# Patient Record
Sex: FEMALE | Race: WHITE | NOT HISPANIC OR LATINO | Employment: OTHER | URBAN - METROPOLITAN AREA
[De-identification: names, ages, dates, MRNs, and addresses within clinical notes are randomized per-mention and may not be internally consistent; named-entity substitution may affect disease eponyms.]

---

## 2017-05-01 ENCOUNTER — TELEPHONE (OUTPATIENT)
Dept: VASCULAR SURGERY | Facility: CLINIC | Age: 72
End: 2017-05-01

## 2017-05-01 DIAGNOSIS — R60.0 LOCALIZED EDEMA: Primary | ICD-10-CM

## 2017-05-01 NOTE — TELEPHONE ENCOUNTER
----- Message from Daisy Singh sent at 5/1/2017  4:37 PM CDT -----  Contact: self [phone]  Please call her to schedule an appt for painful varicose veins.  Thank you!

## 2017-05-01 NOTE — TELEPHONE ENCOUNTER
----- Message from Rosemarie Chaves sent at 5/1/2017  4:33 PM CDT -----  Contact: Patient  Patient wants to get an appointment  for varicose veins, Please call her at [phone].    Thanks  Td

## 2017-05-03 ENCOUNTER — HOSPITAL ENCOUNTER (OUTPATIENT)
Dept: RADIOLOGY | Facility: HOSPITAL | Age: 72
Discharge: HOME | End: 2017-05-03
Attending: THORACIC SURGERY (CARDIOTHORACIC VASCULAR SURGERY)
Payer: MEDICARE

## 2017-05-03 DIAGNOSIS — R60.0 LOCALIZED EDEMA: ICD-10-CM

## 2017-05-03 PROCEDURE — 93970 EXTREMITY STUDY: CPT | Mod: 26,,, | Performed by: RADIOLOGY

## 2017-05-03 PROCEDURE — 93970 EXTREMITY STUDY: CPT | Mod: TC,PO

## 2017-05-25 ENCOUNTER — OFFICE VISIT (OUTPATIENT)
Dept: VASCULAR SURGERY | Facility: CLINIC | Age: 72
End: 2017-05-25
Payer: MEDICARE

## 2017-05-25 VITALS
HEART RATE: 81 BPM | RESPIRATION RATE: 18 BRPM | HEIGHT: 65 IN | BODY MASS INDEX: 27.82 KG/M2 | SYSTOLIC BLOOD PRESSURE: 129 MMHG | DIASTOLIC BLOOD PRESSURE: 72 MMHG | WEIGHT: 167 LBS

## 2017-05-25 DIAGNOSIS — I87.8 VENOUS STASIS: ICD-10-CM

## 2017-05-25 DIAGNOSIS — I87.2 VENOUS INSUFFICIENCY OF RIGHT LOWER EXTREMITY: Primary | ICD-10-CM

## 2017-05-25 PROCEDURE — 99213 OFFICE O/P EST LOW 20 MIN: CPT | Mod: PBBFAC,PO | Performed by: THORACIC SURGERY (CARDIOTHORACIC VASCULAR SURGERY)

## 2017-05-25 PROCEDURE — 99214 OFFICE O/P EST MOD 30 MIN: CPT | Mod: S$PBB,,, | Performed by: THORACIC SURGERY (CARDIOTHORACIC VASCULAR SURGERY)

## 2017-05-25 PROCEDURE — 99999 PR PBB SHADOW E&M-EST. PATIENT-LVL III: CPT | Mod: PBBFAC,,, | Performed by: THORACIC SURGERY (CARDIOTHORACIC VASCULAR SURGERY)

## 2017-05-25 NOTE — PROGRESS NOTES
OFFICE VISIT NOTE    Ms. Roberts is a 71-year-old female, who has venous stasis of the right lower   extremity.  I performed coronary artery bypass grafting on her in 2015.  She   had left main coronary artery stenosis and atrial fibrillation.  I also did   excision of left atrial appendage and MACE procedure.  Over the last five years,   she has been developing hyperpigmentation with induration of skin and   subcutaneous tissue of the distal one-third of the right leg.  She also has   significant varicose veins.  She does not have these changes on the left.  She   has been wearing compression stockings without much help.  However, she has not   been wearing these stockings every day.    PAST MEDICAL HISTORY:  Protein S deficiency of hypercoagulable state, atrial   fibrillation, coronary artery disease, hyperlipidemia.    PAST SURGICAL HISTORY:  Cardiac catheterization, coronary artery bypass graft,   hysterectomy with salpingo-oophorectomy, appendectomy, cholecystectomy,   hemorrhoidectomy, intracapsular cataract extraction.    ALLERGIES:  Statins.    MEDICATIONS:  Tylenol, Xanax, Paxil, Betapace, Coumadin, digoxin, Norco.    Whenever the patient gets off Coumadin, she uses Lovenox.    FAMILY HISTORY:  Coronary artery disease and hypertension.    SOCIAL HISTORY:  She never smoked cigarettes.  Denies alcohol use.    REVISION OF SYSTEMS:  She complains of varicose veins of the right lower   extremity with hyperpigmentation, especially on the medial aspect of the distal   one-third of the left leg.  She also complains of severe pain in the right lower   extremity.  This is especially over her varicose veins.  All other systems were   reviewed and are negative.    PHYSICAL EXAMINATION:  VITAL SIGNS:  Blood pressure is 129/72, respiratory rate is 16, heart rate is   81, height 5 feet 5 inches, weight 167 pounds.  GENERAL:  She is awake and alert, in no apparent distress.  HEENT:  Head is normocephalic.  Pupils are  equal, round, reactive.  Sclerae are   anicteric.  NECK:  Supple.  Trachea midline.  LUNGS:  Clear.  HEART:  Has a regular rate and rhythm.  The sternum is stable.  She has a   well-healed midline sternotomy scar.  ABDOMEN:  Soft and nontender.  EXTREMITIES:  Left lower extremity has no changes of venous stasis.  The right   lower extremity has hyperpigmentation of the distal one-third of the leg,   especially on the medial aspect.  She has varicose veins in the thigh and the   legs and has 1+ edema of the right lower extremity.  Feet are warm.  NEUROLOGIC:  Awake, alert and oriented.  No lateralizing neurologic deficits.  PULSE:  2+ brachial, 2+ radial, 2+ femoral pulses bilaterally.  She has 1+ right   posterior tibial pulse and 1+ left dorsalis pedis pulse.    Ultrasound of the veins of the lower extremities showed nonvisualization of the   left greater saphenous vein.  There was no reflux seen in the left short   saphenous vein.  The right greater saphenous vein was of a duplicated system   with one of these two seen down only to the mid thigh.  The other saphenous vein   going from the groin all the way down to the leg and ankle measured 9.6 mm at   the saphenofemoral junction, 5.5 mm at the mid thigh and 6.1 mm at the knee.    Reflux times in this vein was as high as 4115 milliseconds.  The right short   saphenous vein is 5.6 mm and reflux time is 1188 milliseconds.    IMPRESSION:  1.  Venous insufficiency of the right greater saphenous vein.  2.  Venous insufficiency of the right short saphenous vein.  3.  Venous stasis of the right lower extremity.  4.  Varicose veins with pain and edema of the right lower extremity.  5.  Status post coronary artery bypass grafting.  6.  Hypercoagulable state.  7.  Protein S deficiency.  8.  Hyperlipidemia.  9.  History of atrial fibrillation.    RECOMMENDATIONS:  The patient is very symptomatic in the right lower extremity   with venous stasis changes and varicose veins  causing pain and edema.  The   changes in the right lower extremity are consistent with lipodermatosclerosis.    I think the patient would benefit from EVLT of the right greater and right   lesser saphenous veins followed by sclerotherapy.  However, she does have a   protein S deficiency.  If we do not get her off her anticoagulation, the chances   of success decreases significantly.  However, if we take her off her   anticoagulation, she is at increased risk for thrombosis.  This was discussed   with the patient.  She is not sure what she wants to do.  She will think about   this and let me now.  In the meanwhile, prescription for knee-high compression   stockings 20 to 30 mmHg was given.  She will continue to wear this and elevate   the legs as much as possible.  The patient had a panty hose style compression   stockings and she states that this was just too much for her and therefore she   could not wear it every day.  Hopefully, she will be more compliant with the   knee high stockings.  If she decides to proceed, we would stop her Coumadin   about three days before and let her use Lovenox up until about 24 hours prior to   the procedure and then do the procedure and restart Coumadin one to two days   after the procedure.  She will think about this and let me know.      FRANKIE  dd: 05/25/2017 10:02:12 (CDT)  td: 05/25/2017 22:04:15 (CDT)  Doc ID   #[id number]  Job ID #[id number]    CC:

## 2021-02-26 ENCOUNTER — TELEPHONE (OUTPATIENT)
Dept: VASCULAR SURGERY | Facility: CLINIC | Age: 76
End: 2021-02-26